# Patient Record
Sex: FEMALE
[De-identification: names, ages, dates, MRNs, and addresses within clinical notes are randomized per-mention and may not be internally consistent; named-entity substitution may affect disease eponyms.]

---

## 2017-07-10 ENCOUNTER — HOSPITAL ENCOUNTER (EMERGENCY)
Dept: HOSPITAL 31 - C.ER | Age: 22
Discharge: HOME | End: 2017-07-10
Payer: MEDICAID

## 2017-07-10 VITALS
RESPIRATION RATE: 18 BRPM | TEMPERATURE: 98 F | DIASTOLIC BLOOD PRESSURE: 72 MMHG | HEART RATE: 92 BPM | SYSTOLIC BLOOD PRESSURE: 112 MMHG

## 2017-07-10 VITALS — OXYGEN SATURATION: 100 %

## 2017-07-10 VITALS — BODY MASS INDEX: 23.8 KG/M2

## 2017-07-10 DIAGNOSIS — Z3A.00: ICD-10-CM

## 2017-07-10 DIAGNOSIS — O23.40: Primary | ICD-10-CM

## 2017-07-10 LAB
BACTERIA #/AREA URNS HPF: (no result) /[HPF]
BILIRUB UR-MCNC: NEGATIVE MG/DL
GLUCOSE UR STRIP-MCNC: NORMAL MG/DL
LEUKOCYTE ESTERASE UR-ACNC: (no result) LEU/UL
PH UR STRIP: 6 [PH] (ref 5–8)
PROT UR STRIP-MCNC: (no result) MG/DL
RBC # UR STRIP: (no result) /UL
SP GR UR STRIP: 1.02 (ref 1–1.03)
SQUAMOUS EPITHIAL: 19 /HPF (ref 0–5)
URINE NITRATE: NEGATIVE
UROBILINOGEN UR-MCNC: NORMAL MG/DL (ref 0.2–1)

## 2017-07-10 NOTE — US
PROCEDURE:  Ultrasound of the Kidneys



HISTORY:

pregnant, dysuria



COMPARISON:

None available.



TECHNIQUE:

Sonogram of the kidneys.



FINDINGS:



RIGHT KIDNEY:

Measures: 10.9 cm. 



Normal in size, contour and echogenicity. 



No stone, solid mass lesion or hydronephrosis visualized. 



LEFT KIDNEY:

Measures: 11.6 cm. 



Normal in size, contour and echogenicity. 



No stone, solid mass lesion or hydronephrosis visualized. 



OTHER FINDINGS:

Urinary bladder nondistended at the time of this examination.



IMPRESSION:

Unremarkable renal sonogram.

## 2017-07-10 NOTE — C.PDOC
History Of Present Illness


21 year old female who presents to the ER with a complaint of breast tenderness

, feeling sick in the mornings, and occasional vomiting. She reports occasional 

epigastric pain after AM vomiting.  Patient also reports intermittent burning 

with urination; LMP was in 4/17. Denies fever, vaginal bleeding, vaginal 

discharge, or abdominal pain.


Time Seen by Provider: 07/10/17 10:09


Chief Complaint (Nursing): Abdominal Pain


History Per: Patient


History/Exam Limitations: no limitations


Onset/Duration Of Symptoms: Days


Current Symptoms Are (Timing): Still Present


Recent travel outside of the [place]: No





Past Medical History


Reviewed: Historical Data, Nursing Documentation, Vital Signs


Vital Signs: 


 Last Vital Signs











Temp  98.0 F   07/10/17 12:12


 


Pulse  92 H  07/10/17 12:12


 


Resp  18   07/10/17 12:12


 


BP  112/72   07/10/17 12:12


 


Pulse Ox  100   07/10/17 13:01














- Medical History


PMH: Migraine


Surgical History: No Surg Hx


Family History: States: Unknown Family Hx





- Social History


Hx Alcohol Use: No


Hx Substance Use: No





- Immunization History


Hx Tetanus Toxoid Vaccination: No


Hx Influenza Vaccination: No


Hx Pneumococcal Vaccination: No





Review Of Systems


Constitutional: Negative for: Fever


ENT: Negative for: Ear Pain, Ear Discharge


Cardiovascular: Negative for: Chest Pain, Palpitations, Orthopnea, Edema, Light 

Headedness


Respiratory: Negative for: Shortness of Breath, SOB with Excertion, Wheezing


Gastrointestinal: Positive for: Nausea (in morning), Vomiting.  Negative for: 

Abdominal Pain, Diarrhea, Constipation


Genitourinary: Positive for: Dysuria.  Negative for: Vaginal Discharge, Vaginal 

Bleeding


Musculoskeletal: Positive for: Other (Breast pain)


Psych: Negative for: Anxiety, Depression





Physical Exam





- Physical Exam


Appears: Well, Non-toxic, No Acute Distress


Skin: Normal Color, Warm, Dry


Head: Atraumatic, Normacephalic


Eye(s): bilateral: Normal Inspection, PERRL, EOMI


Oral Mucosa: Moist


Neck: Normal, Supple


Chest: Symmetrical (No palpable lumps), No Tenderness, Other (No nipple 

discharge (chaperoned by mother during breast exam))


Cardiovascular: Rhythm Regular, No Murmur


Respiratory: Normal Breath Sounds, No Rales, No Rhonchi, No Wheezing


Gastrointestinal/Abdominal: Soft, No Tenderness, No Mass, No Distention


Extremity: Normal ROM


Neurological/Psych: Oriented x3, Normal Speech, Normal Cognition





ED Course And Treatment


O2 Sat by Pulse Oximetry: 100 (Room air)


Pulse Ox Interpretation: Normal





Medical Decision Making


Medical Decision Making: 





POC urine pregnancy was positive.  Spoke to patient and she is aware.  I 

advised patient that breast sensitivity can be normal in pregnancy but that she 

needed to follow-up with ob/gyn for mammography if breast pain continues or 

lump develops.  Patient is currently tolerating po in ED and denies abdominal 

pain.  Symptoms likely secondary to "morning sickness."





UA shows trace ketones, but patient is tolerating po.  Shows blood, leukocytes, 

wbc and bacteria. Will treat with macrobid.  Renal u/s is negative and patient 

has no flank or abdominal pain.  Abdomen is soft NT/ND





Will dc with prenatal vitamin.  Advised to follow up with OB/GYN for further 

monitoring of this pregnancy





Disposition





- Disposition


Referrals: 


Jersey Clemons MD [Staff Provider] - 


Disposition: HOME/ ROUTINE


Disposition Time: 12:56


Condition: GOOD


Additional Instructions: 


Take full course of antibiotics for uti.  Take daily prenatal vitamin.  Follow-

up with OB within 2 days for further monitoring of your pregnancy.  Return to 

ED if condition worsens.


Prescriptions: 


Nitrofurantoin Macrocrystals [Macrobid] 100 mg PO BID #20 cap


Prenatal Vit No.126/Iron/Folic [Classic Prenatal Tablet] 1 each PO DAILY #30 

tablet


Instructions:  Morning Sickness (ED), Pregnancy (ED), Urinary Tract Infection 

in Pregnancy (ED)


Print Language: Wallisian





- Clinical Impression


Clinical Impression: 


 Pregnancy, UTI (urinary tract infection)








- Scribe Statement


The provider has reviewed the documentation as recorded by the Scribglory Yang





All medical record entries made by the Scribe were at my direction and 

personally dictated by me. I have reviewed the chart and agree that the record 

accurately reflects my personal performance of the history, physical exam, 

medical decision making, and the department course for this patient. I have 

also personally directed, reviewed, and agree with the discharge instructions 

and disposition.

## 2018-02-11 ENCOUNTER — HOSPITAL ENCOUNTER (INPATIENT)
Dept: HOSPITAL 14 - H.EROB2 | Age: 23
LOS: 6 days | Discharge: HOME | DRG: 371 | End: 2018-02-17
Payer: COMMERCIAL

## 2018-02-11 VITALS — BODY MASS INDEX: 31.6 KG/M2

## 2018-02-11 DIAGNOSIS — Z3A.41: ICD-10-CM

## 2018-02-11 DIAGNOSIS — O48.0: ICD-10-CM

## 2018-02-11 LAB
BASOPHILS # BLD AUTO: 0 K/UL (ref 0–0.2)
BASOPHILS NFR BLD: 0.5 % (ref 0–2)
EOSINOPHIL # BLD AUTO: 0.1 K/UL (ref 0–0.7)
EOSINOPHIL NFR BLD: 0.8 % (ref 0–4)
ERYTHROCYTE [DISTWIDTH] IN BLOOD BY AUTOMATED COUNT: 14.8 % (ref 11.5–14.5)
HGB BLD-MCNC: 12.2 G/DL (ref 12–16)
LYMPHOCYTES # BLD AUTO: 1.7 K/UL (ref 1–4.3)
LYMPHOCYTES NFR BLD AUTO: 22 % (ref 20–40)
MCH RBC QN AUTO: 30.7 PG (ref 27–31)
MCHC RBC AUTO-ENTMCNC: 33.9 G/DL (ref 33–37)
MCV RBC AUTO: 90.8 FL (ref 81–99)
MONOCYTES # BLD: 0.8 K/UL (ref 0–0.8)
MONOCYTES NFR BLD: 9.7 % (ref 0–10)
NEUTROPHILS # BLD: 5.2 K/UL (ref 1.8–7)
NEUTROPHILS NFR BLD AUTO: 67 % (ref 50–75)
NRBC BLD AUTO-RTO: 0.1 % (ref 0–0)
PLATELET # BLD: 278 K/UL (ref 130–400)
PMV BLD AUTO: 7.8 FL (ref 7.2–11.7)
RBC # BLD AUTO: 3.96 MIL/UL (ref 3.8–5.2)
WBC # BLD AUTO: 7.7 K/UL (ref 4.8–10.8)

## 2018-02-11 PROCEDURE — 4A1HXCZ MONITORING OF PRODUCTS OF CONCEPTION, CARDIAC RATE, EXTERNAL APPROACH: ICD-10-PCS

## 2018-02-12 NOTE — OBHP
===========================

Datetime: 2018 00:11

===========================

   

FHR - Baseline A Provider:  150

Vital Signs Provider:  Reviewed

NICHD Variability Prov Fetus A:  Moderate 6-25bpm

NICHD Accel Fetus A IP Provider:  [id number]

FHR Category Provider Fetus A:  Category I

NICHD Decel Fetus A IP Provider:  None

Dilatation, Provider:  0

Effacement, Provider:  0

Station, Provider:  0

   

===========================

Datetime: 2018 23:15

===========================

   

IP Adm Impression:  Term, intrauterine pregnancy; No Active Labor

IP Adm Impression Other:  Post EDC

IP Admit Plan:  Admit to unit; Initiate labor induction protocol

Admit Comment, IP Provider:  23 yo F,  at 41 weeks gestational age, with RAYMOND 18, presented to
 labor and delivery unit for scheduled induction of labor. 

   Reports good fetal movement, denies vaginal bleeding, denies contractions, denies loss of fluid. H
as no complaints at this time. 

      

   Prenatal care: [facility], Dr. Carr

   GBS neg (18),  HIV neg (17) RPR NR (17), HbsAg neg, Rubella immue, CL/GC neg, ABO
 Opos antibody neg, CXR no active disease 2017. 

   Received flu and tdap prenatally

   U/s  no previa, cephalic presentation.

      

   ObGYN Hx: none; first pregnancy. denies hx abnormal gyn issues.

   PMH: none

   Past Surg hx: none

   Family hx: diabetes mellitus on father's side

   Meds: PNV

   Allergies: NKDA

      

   ROS: denies headache, blurry vision, chest pain, shortness of breath, nausea, vomiting, diarrhea, 
constipation, edema.

      

   Assessment: 23 yo  with single IUP at 41 weeks gestational age, here for scheduled induction o
f labor.

   Plan: Admit to unit, initiate induction of labor. 

   [email or web address]

   Discussed w/ Dr. Hill

      

   Pt seen and discussed with Resident and I agree with the above.

Extremities - PN:  Normal

Abdomen - PN:  Normal

Back - PN:  Normal

Breast - PN:  Not Done

Lungs - PN:  Normal

Heart - PN:  Normal

Thyroid - PN:  Not Done

Neurologic - PN:  Normal

HEENT - PN:  Normal

General - PN:  Normal

Comments, ACOG Physical Exam:  Gen: alert, oriented, no acute distress

   CV: regular rhythm

   Resp: normal effort of respitation

   Abdomen: gravid, nontender

   Extremities: no significant edema

      

   SVE: cervix closed and long. 

IP Prenatal Hx Assessment:  The Prenatal History has been Reviewed and is Current

EGA AdmitDate IP:  41.0

IP Indication for Induction:  Postterm

IP Chief Complaint:  Scheduled induction of labor

Genitourinary Exam:  Normal

DTRs - PN:  Not Done

## 2018-02-12 NOTE — OBADHP
===========================

Datetime: 2018 00:11

===========================

   

FHR - Baseline A Provider:  150

Vital Signs Provider:  Reviewed

NICHD Variability Prov Fetus A:  Moderate 6-25bpm

NICHD Accel Fetus A IP Provider:  [id number]

FHR Category Provider Fetus A:  Category I

NICHD Decel Fetus A IP Provider:  None

Dilatation, Provider:  0

Effacement, Provider:  0

Station, Provider:  0

   

===========================

Datetime: 2018 23:15

===========================

   

IP Adm Impression Other:  Post EDC

Admit Comment, IP Provider:  23 yo F,  at 41 weeks gestational age, with RAYMOND 18, presented to
 labor and delivery unit for scheduled induction of labor. 

   Reports good fetal movement, denies vaginal bleeding, denies contractions, denies loss of fluid. H
as no complaints at this time. 

      

   Prenatal care: [facility], Dr. Carr

   GBS neg (18),  HIV neg (17) RPR NR (17), HbsAg neg, Rubella immue, CL/GC neg, ABO
 Opos antibody neg, CXR no active disease 2017. 

   Received flu and tdap prenatally

   U/s  no previa, cephalic presentation.

      

   ObGYN Hx: none; first pregnancy. denies hx abnormal gyn issues.

   PMH: none

   Past Surg hx: none

   Family hx: diabetes mellitus on father's side

   Meds: PNV

   Allergies: NKDA

      

   ROS: denies headache, blurry vision, chest pain, shortness of breath, nausea, vomiting, diarrhea, 
constipation, edema.

      

   Assessment: 23 yo  with single IUP at 41 weeks gestational age, here for scheduled induction o
f labor.

   Plan: Admit to unit, initiate induction of labor. 

   [email or web address]

   Discussed w/ Dr. Hill

      

   Pt seen and discussed with Resident and I agree with the above.

Extremities - PN:  Normal

Abdomen - PN:  Normal

Back - PN:  Normal

Breast - PN:  Not Done

Lungs - PN:  Normal

Heart - PN:  Normal

Thyroid - PN:  Not Done

Neurologic - PN:  Normal

HEENT - PN:  Normal

General - PN:  Normal

Comments, ACOG Physical Exam:  Gen: alert, oriented, no acute distress

   CV: regular rhythm

   Resp: normal effort of respitation

   Abdomen: gravid, nontender

   Extremities: no significant edema

      

   SVE: cervix closed and long. 

IP Prenatal Hx Assessment:  The Prenatal History has been Reviewed and is Current

IP Chief Complaint:  Scheduled induction of labor

Genitourinary Exam:  Normal

DTRs - PN:  Not Done

EGA AdmitDate IP:  41.0

IP Adm Impression:  Term, intrauterine pregnancy; No Active Labor

IP Admit Plan:  Admit to unit; Initiate labor induction protocol

## 2018-02-12 NOTE — OBPN
===========================

Datetime: 02/12/2018 12:16

===========================

   

IP Progress Impression Other:  Induction  for post-dates

IP Procedures:  Sterile Vag Exam

IP Progress Plan:  Induction

Membranes, Provider:  Intact

Contraction Comments Provider:  Q2-3 

FHR - Baseline A Provider:  130s

IP Progress Note Comment:  23 yo G1 at 41+1 wks for induction of labor for post-dates

   NST reactive 

   Will start miso 50 mcg q 4 to continue IOL 

      

      

NICHD Accel Fetus A IP Provider:  [id number]

FHR Category Provider Fetus A:  Category I

NICHD Variability Prov Fetus A:  Minimal - Undetectable to <5bpm

Dilatation, Provider:  1

Effacement, Provider:  65

Station, Provider:  -3

NICHD Decel Fetus A IP Provider:  None

   

===========================

Datetime: 02/12/2018 07:49

===========================

   

IP Progress Impression:  Normal progression of labor; Reassuring fetal heart rate

Gestation - Est Wks by US:  41.0

Fetal Presentation-Admit:  Vertex

   

===========================

Datetime: 02/12/2018 00:11

===========================

   

Vital Signs Provider:  Reviewed

## 2018-02-13 RX ADMIN — NALBUPHINE HYDROCHLORIDE PRN MG: 20 INJECTION, SOLUTION INTRAMUSCULAR; INTRAVENOUS; SUBCUTANEOUS at 17:54

## 2018-02-13 RX ADMIN — NALBUPHINE HYDROCHLORIDE PRN MG: 20 INJECTION, SOLUTION INTRAMUSCULAR; INTRAVENOUS; SUBCUTANEOUS at 02:44

## 2018-02-13 RX ADMIN — NALBUPHINE HYDROCHLORIDE PRN MG: 20 INJECTION, SOLUTION INTRAMUSCULAR; INTRAVENOUS; SUBCUTANEOUS at 10:30

## 2018-02-13 NOTE — OBPN
===========================

Datetime: 02/13/2018 18:46

===========================

   

IP Progress Impression:  Reassuring fetal heart rate

IP Progress Plan:  Induction

Contraction Comments Provider:  4-9min

FHR - Baseline A Provider:  120

Fetal Presentation-Admit:  Vertex

IP Progress Note Comment:   s: no c/o. 

   i: 41.2wk induction

   s/p cervidel _ cytotec

   p: begin pitoicn

   plan d/w pt.

Vital Signs Provider:  Within Normal Limits

NICHD Accel Fetus A IP Provider:  [id number]

FHR Category Provider Fetus A:  Category I

NICHD Variability Prov Fetus A:  Minimal - Undetectable to <5bpm

NICHD Decel Fetus A IP Provider:  None

## 2018-02-14 RX ADMIN — OXYCODONE HYDROCHLORIDE AND ACETAMINOPHEN PRN TAB: 5; 325 TABLET ORAL at 14:29

## 2018-02-14 RX ADMIN — OXYCODONE HYDROCHLORIDE AND ACETAMINOPHEN PRN TAB: 5; 325 TABLET ORAL at 21:57

## 2018-02-14 NOTE — OP
PROCEDURE DATE:



PREOPERATIVE DIAGNOSES:  Arrest of dilatation, fetal intolerance to labor.



POSTOPERATIVE DIAGNOSES:  Arrest of dilatation, fetal intolerance to labor.



OPERATION PERFORMED:  Primary low flap transverse  section by

Pfannenstiel skin incision.



SURGEON:  Cesar Demarco MD



ASSISTANT:  Dr. Garcia.  She was instrumentally taking care of the

patient.  She helped to create the exposure, she was helpful in obtaining

hemostasis, delivering the infant and closure of the patient.  The

procedure would not have been possible without her assistance.



ESTIMATED BLOOD LOSS:  800 mL.



URINE OUTPUT:  Huertas catheter put out approximately 175 mL of clear urine. 

The patient received approximately 1 liter of D5 LR intraoperatively.



TYPE OF ANESTHESIA:  Epidural.



ANESTHESIA ADMINISTERED BY:  Kera Johnson MD



OPERATIVE FINDINGS:  Baby girl, Apgars 9 and 9, vertex presentation,

weighing 3820 grams.  Normal uterus, tubes and ovaries are identified.



DESCRIPTION OF PROCEDURE:  After informed consent was obtained, the patient

was taken to the operating room where she was post her epidural, she was

then placed in a supine position with the leftward tilt.  The patient was

then prepped and draped in a normal sterile fashion.  A Pfannenstiel skin

incision was then made with a scalpel and carried down to the underlying

layer of fascia.  The fascia was nicked in the midline.  The fascial

incision was then extended laterally with the curved Blount scissors.  The

superior aspect of the fascial incision was then grasped with Kocher

clamps, elevated up and the rectus muscles were dissected off using both

sharp and blunt dissection.  Attention was then turned to the inferior

aspect of the fascial incision, which in similar fashion was grasped with

Kocher clamps, elevated up, and the rectus muscles were dissected off using

both sharp and blunt dissection.  The rectus muscles were then  in

the midline.  The peritoneum was identified and entered sharply with the

Metzenbaum scissors.  The peritoneal incision was then extended superiorly

and inferiorly with good visualization of the bladder.  The bladder blade

was inserted.  The vesicouterine and peritoneum were identified and entered

sharply with the Metzenbaum scissors.  The incision was then extended

laterally and the bladder flap was created digitally.  The bladder blade

was then re-adjusted.  A low transverse incision was then made with a

scalpel.  The incision was then extended laterally with bandage scissors. 

The infant's head was then delivered atraumatically.  The nose and mouth

were suctioned with _____ suction trap.  The cord was clamped and cut and

the infant was handed off to waiting pediatricians.  The placenta was then

removed manually.  The uterus was exteriorized and cleared of all clots and

debris.  The uterine incision was then repaired with 0 Vicryl in a running

lock fashion.  A second layer of the same suture was used to obtain

excellent hemostasis.  The abdomen was then copiously irrigated.  The

irrigant was removed with a suction device.  Hemostasis was noted.  The

uterus was returned to the abdomen.  The gutters were then cleared of all

clots and debris.  The peritoneum was closed with 2-0 Vicryl in a running

fashion.  The muscle was closed with 0 Vicryl in an interrupted fashion. 

The fascia was closed with 0-Vicryl in a running fashion.  The skin was

closed with 4-0 on a Markus needle.  All sponge, lap, needle, and instrument

counts were correct x2, and the patient was taken to the recovery room in

awake and stable condition.





__________________________________________

Cesar Demarco MD





DD:  2018 11:41:18

DT:  2018 13:14:16

Job # [id number]

## 2018-02-14 NOTE — OBPN
===========================

Datetime: 02/14/2018 07:15

===========================

   

IP Progress Note Comment:  s: no c/o

   o: 4/100/-3

   arom of forebag-clear

   pit @ 8mU

   i: 41.33wk

   induction

   p: continue pit

## 2018-02-15 LAB
ERYTHROCYTE [DISTWIDTH] IN BLOOD BY AUTOMATED COUNT: 14.9 % (ref 11.5–14.5)
HGB BLD-MCNC: 9.9 G/DL (ref 12–16)
MCH RBC QN AUTO: 31.6 PG (ref 27–31)
MCHC RBC AUTO-ENTMCNC: 34.9 G/DL (ref 33–37)
MCV RBC AUTO: 90.6 FL (ref 81–99)
PLATELET # BLD: 225 K/UL (ref 130–400)
RBC # BLD AUTO: 3.13 MIL/UL (ref 3.8–5.2)
WBC # BLD AUTO: 14.5 K/UL (ref 4.8–10.8)

## 2018-02-15 RX ADMIN — OXYCODONE HYDROCHLORIDE AND ACETAMINOPHEN PRN TAB: 5; 325 TABLET ORAL at 10:29

## 2018-02-15 RX ADMIN — OXYCODONE HYDROCHLORIDE AND ACETAMINOPHEN PRN TAB: 5; 325 TABLET ORAL at 22:22

## 2018-02-15 RX ADMIN — OXYCODONE HYDROCHLORIDE AND ACETAMINOPHEN PRN TAB: 5; 325 TABLET ORAL at 01:51

## 2018-02-15 RX ADMIN — OXYCODONE HYDROCHLORIDE AND ACETAMINOPHEN PRN TAB: 5; 325 TABLET ORAL at 18:37

## 2018-02-15 NOTE — OBPPN
===========================

Datetime: 02/15/2018 07:44

===========================

   

PP Pain Prov:  Within normal limits

PP Nausea Prov:  Denies

PP Flatus Prov:  No

PP BM Prov:  No

PP Breasts Prov:  Not Done

PP Heart Prov:  Normal

PP Lungs Prov:  Normal

PP Abdomen/Uterus Prov:  Normal

PP Lochia Prov:  Not Done

PP Vulva/Perineum Prov:  Not Done

PP CVA Tenderness Prov:  Not Done

PP Extremities Prov:  Normal

PP C/S Incision Prov:  Normal

PP Breastfeeding Progress Prov:  Normal

PP Impression Prov:  Normal postpartum progression

PP Plan Prov:  Continue present management

PP Progress Note Prov:  23 yo  s/p primary , POD 1. Pt reports abdominal pain overnig
ht that is alleviated with medication. Has not ambulated out of bed yet. Has not voided- higgins remove
d this am- and has not passed gas yet. Breast and bottle feeding infant. Tolerating liquid diet witho
ut nausea/vomitting. Denies dizziness, chest pain, shortness of breath, nausea, vomiting, pain in rebeca
ves.

      

   PE:

   Gen: alert, oriented

   CV: S1S2,RRR

   Resp: clear to auscultation bilaterally

   Abd: Dressing in place, dressing is dry and clean. Tender to palpation, uterus firm. 

   Ext: no edema, no tenderness

      

   A: 23 yo , POD 1 after primary .

   P: Encourage breastfeeding and ambulation. Pain control. Encourage incentive spirometer use. Pendi
mahesh postpartum CBC. 

   [email or web address] 

      

   Addendum:

   Called to see patient due to c/o chest pain.  VSS at this time.  Plan to check Spiral CT to R/O PE
.  Discussed plan with patient and all patient questions answered

Vital Signs Provider PP:  Reviewed

## 2018-02-16 RX ADMIN — OXYCODONE HYDROCHLORIDE AND ACETAMINOPHEN PRN TAB: 5; 325 TABLET ORAL at 17:56

## 2018-02-16 RX ADMIN — OXYCODONE HYDROCHLORIDE AND ACETAMINOPHEN PRN TAB: 5; 325 TABLET ORAL at 09:29

## 2018-02-16 RX ADMIN — OXYCODONE HYDROCHLORIDE AND ACETAMINOPHEN PRN TAB: 5; 325 TABLET ORAL at 22:38

## 2018-02-16 NOTE — OBPPN
===========================

Datetime: 2018 07:03

===========================

   

PP Pain Prov:  Within normal limits

PP Nausea Prov:  Denies

PP Flatus Prov:  Yes

PP BM Prov:  No

PP Breasts Prov:  Not Done

PP Heart Prov:  Normal

PP Lungs Prov:  Normal

PP Abdomen/Uterus Prov:  Normal

PP Lochia Prov:  Normal

PP Vulva/Perineum Prov:  Not Done

PP CVA Tenderness Prov:  Not Done

PP Extremities Prov:  Normal

PP C/S Incision Prov:  Normal

PP Breastfeeding Progress Prov:  Normal

PP Impression Prov:  Normal postpartum progression

PP Progress Note Prov:  23 yo  s/p , POD 2. Reports pain that is controlled with medi
cation. Ambulated around unit yesterday without dizziness/difficulty. Voiding freely on her own, pass
ing gas, has not had BM. Lochia like menses. Tolerating regular diet. Breast and bottle feeding infan
t. 

      

   Yesterday she complained of chest pain and CT angio was ordered, but pt declined to undergo exam; 
diagnostic value of exam was explained to pt. Currently denies dizziness, shortness of breath, nausea
, vomiting,  leg/calf pain.

      

   PE:

   Gen: alert, NAD

   CV: S1S2

   Resp: clear breath sounds

   Abd: wearing abdominal binder at start of exam. Dressing has been removed; incision is clean and d
ry, steri strips in place. No erythema, blood or other exudates. 

   Ext: no pain/tenderness

      

   A: 23 yo  s/p  on day 2. Experienced chest pain yesterday that has since resolved;
 pt declined CT angio.

   P: Continue current management; encourage breastfeeding and ambulation, pain control. Encourage in
centive spirometry. Encourage pt to alert if she feels recurrent chest pain.

      

   The patient was seen with the resident and I agree with the notes

Vital Signs Provider PP:  Reviewed

## 2018-02-17 VITALS
HEART RATE: 109 BPM | SYSTOLIC BLOOD PRESSURE: 96 MMHG | OXYGEN SATURATION: 98 % | RESPIRATION RATE: 20 BRPM | TEMPERATURE: 98.2 F | DIASTOLIC BLOOD PRESSURE: 61 MMHG

## 2018-02-17 RX ADMIN — OXYCODONE HYDROCHLORIDE AND ACETAMINOPHEN PRN TAB: 5; 325 TABLET ORAL at 03:05

## 2018-02-25 ENCOUNTER — HOSPITAL ENCOUNTER (EMERGENCY)
Dept: HOSPITAL 14 - H.ER | Age: 23
Discharge: HOME | End: 2018-02-25
Payer: COMMERCIAL

## 2018-02-25 VITALS — RESPIRATION RATE: 18 BRPM

## 2018-02-25 VITALS
DIASTOLIC BLOOD PRESSURE: 73 MMHG | OXYGEN SATURATION: 99 % | TEMPERATURE: 98.8 F | HEART RATE: 72 BPM | SYSTOLIC BLOOD PRESSURE: 122 MMHG

## 2018-02-25 VITALS — BODY MASS INDEX: 31.6 KG/M2

## 2018-02-25 DIAGNOSIS — N39.0: ICD-10-CM

## 2018-02-25 DIAGNOSIS — L76.22: Primary | ICD-10-CM

## 2018-02-25 DIAGNOSIS — Z98.890: ICD-10-CM

## 2018-02-25 LAB
BACTERIA #/AREA URNS HPF: (no result) /[HPF]
BACTERIA #/AREA URNS HPF: (no result) /[HPF]
BILIRUB UR-MCNC: NEGATIVE MG/DL
BILIRUB UR-MCNC: NEGATIVE MG/DL
COLOR UR: YELLOW
COLOR UR: YELLOW
GLUCOSE UR STRIP-MCNC: (no result) MG/DL
GLUCOSE UR STRIP-MCNC: (no result) MG/DL
LEUKOCYTE ESTERASE UR-ACNC: (no result) LEU/UL
LEUKOCYTE ESTERASE UR-ACNC: (no result) LEU/UL
PH UR STRIP: 6 [PH] (ref 5–8)
PH UR STRIP: 6 [PH] (ref 5–8)
PROT UR STRIP-MCNC: NEGATIVE MG/DL
PROT UR STRIP-MCNC: NEGATIVE MG/DL
RBC # UR STRIP: (no result) /UL
RBC # UR STRIP: (no result) /UL
RENAL EPI CELLS #/AREA URNS HPF: 1 /HPF (ref 0–3)
SP GR UR STRIP: 1 (ref 1–1.03)
SP GR UR STRIP: 1 (ref 1–1.03)
SQUAMOUS EPITHIAL: 1 /HPF (ref 0–5)
SQUAMOUS EPITHIAL: 1 /HPF (ref 0–5)
URINE CLARITY: (no result)
URINE CLARITY: (no result)
URINE NITRATE: NEGATIVE
URINE NITRATE: NEGATIVE
UROBILINOGEN UR-MCNC: (no result) MG/DL (ref 0.2–1)
UROBILINOGEN UR-MCNC: (no result) MG/DL (ref 0.2–1)

## 2018-02-25 NOTE — ED PDOC
HPI: Female  Pain


Time Seen by Provider: 18 04:31


Chief Complaint (Nursing): Abnormal Skin Integrity


Chief Complaint (Provider): Wound bleeding


History Per: Patient


Additional Complaint(s): 





Isaac is 23 yo F patient that presents to ED c/o bleeding from C/Section 

wound since today, she noticed the bleeding 1-2 hours ago when she went to the 

bathroom, patient reports she got the  on 18(10 days ago). Lochia 

like menses, no vaginal discharge. Patient states mild burning on urination. 

Otherwise she denies abdominal pain, wound pain, fever, nausea, vomiting, no 

chest pain, palpitations, sob. 





Past Medical History





- Medical History


PMH: Migraine


   Denies: Depression, Diabetes, HTN





- Family History


Family History: States: Unknown Family Hx





- Immunization History


Hx Tetanus Toxoid Vaccination: No


Hx Influenza Vaccination: No


Hx Pneumococcal Vaccination: No





- Home Medications


Home Medications: 


 Ambulatory Orders











 Medication  Instructions  Recorded


 


Prenatal Vit No.126/Iron/Folic 1 each PO DAILY #30 tablet 07/10/17





[Classic Prenatal Tablet]  


 


Acetaminophen/Oxycodone Hydr 1 tab PO Q6 PRN #20 tab 18





[Oxycodone and Acetaminophen 325  





mg-2.5 mg]  


 


Ferrous Sulfate 325 mg PO DAILY #30 tablet 18


 


Ibuprofen [Motrin Tab] 600 mg PO Q6 PRN #20 tab 18


 


Sennosides A and B [Senokot Tab] 17.2 mg PO HS PRN #14 tab 18


 


Nitrofurantoin Macrocrystals 100 mg PO BID #14 cap 18





[Macrobid]  














- Allergies


Allergies/Adverse Reactions: 


 Allergies











Allergy/AdvReac Type Severity Reaction Status Date / Time


 


No Known Allergies Allergy   Verified 07/10/17 09:38














Review of Systems


ROS Statement: Except As Marked, All Systems Reviewed And Found Negative





Physical Exam





- Reviewed


Nursing Documentation Reviewed: Yes


Vital Signs Reviewed: Yes





- Physical Exam


Appears: Positive for: Well


Head Exam: Positive for: NORMAL INSPECTION


Eye Exam: Positive for: EOMI, PERRL.  Negative for: Nystagmus


Cardiovascular/Chest: Positive for: Regular Rate, Rhythm.  Negative for: Murmur


Respiratory: Positive for: Normal Breath Sounds


Gastrointestinal/Abdominal: Positive for: Bowel Sounds, Soft, Tenderness (Low 

transverse incision noted, middle dot oozing red blood from wound, mild 

induration noted under oozing zone. Mild tenderness on palpation. No erythema 

or deshicense noted.).  Negative for: Distended


Extremity: Positive for: Swelling.  Negative for: Calf Tenderness


Neurologic/Psych: Positive for: Alert, CNs II-XII, Oriented





- Progress


ED Course And Treament: 





Urinalysis


Gelfoam TP on wound site


Compression.


Reeval.





UA positive for UTI


Macrobid 100mg PO once.


OB/GYN consult.





Disposition





- Clinical Impression


Clinical Impression: 


 Post-op bleeding, UTI (urinary tract infection)








- Patient ED Disposition


Is Patient to be Admitted: Transfer of Care


Discussed With : Candelario Parsons


Doctor Will See Patient In The: ED





- Disposition


Disposition: Transfer of Care


Disposition Time: 06:57


Condition: STABLE


Prescriptions: 


Nitrofurantoin Macrocrystals [Macrobid] 100 mg PO BID #14 cap


Instructions:  Urinary Tract Infections in Adults, Bleeding After Surgery


Forms:  [employer] (English)

## 2018-02-25 NOTE — ED PDOC
- ECG


O2 Sat by Pulse Oximetry: 97





- Progress


ED Course And Treament: 


0700 Assumed care from Dr Escalera. Patient awaiting consult and to be seen by Ob 

on call. 





0720 Patient is seen by OB on call. See consult note. Per OB patient can be 

discharged. 





Disposition


Doctor Will See Patient In The: Office


Counseled Patient/Family Regarding: Studies Performed, Diagnosis, Need For 

Followup





- Clinical Impression


Clinical Impression: 


 Post-op bleeding, UTI (urinary tract infection)








- POA


Present On Arrival: None





- Disposition


Referrals: 


[facility] [Outside]


Disposition: Routine/Home


Disposition Time: 07:25


Condition: GOOD


Additional Instructions: 


Follow up with your PCP within 1 week. 


Prescriptions: 


Nitrofurantoin Macrocrystals [Macrobid] 100 mg PO BID #14 cap


Instructions:  Urinary Tract Infections in Adults, Bleeding After Surgery

## 2018-02-25 NOTE — CP.PCM.CON
<Sultan Oscar - Last Filed: 18 08:13>





History of Present Illness





- History of Present Illness


History of Present Illness: 





23 yo  s/p  on 18 presents to ED this AM w/ c/o oozing from 

 wound site this AM. Pt reports she noticed the bleeding 1-2 hours ago 

prior to coming to ED when she went to the bathroom. Denies any injury to the 

site. Pt reports lochia like menses, no vaginal discharge. Patient reports mild 

dysuria. Denies abdominal pain, nausea, vomiting, chest pain, palpitations, sob

, fever or chills. Pt was seen on 18 at [facility] for wound 

check.











Review of Systems





- Constitutional


Constitutional: absent: Chills, Fever





- Cardiovascular


Cardiovascular: absent: Chest Pain, Dyspnea on Exertion





- Respiratory


Respiratory: absent: Cough, Dyspnea





- Gastrointestinal


Gastrointestinal: absent: Abdominal Pain, Nausea, Vomiting





- Genitourinary


Genitourinary: Dysuria





- Integumentary


Integumentary: absent: Rash





- Neurological


Neurological: absent: Headaches





Past Patient History





- Infectious Disease


Hx of Infectious Diseases: None





- Past Social History


Smoking Status: Never Smoked





- CARDIAC


Hx Hypertension: No





- NEUROLOGICAL


Hx Migraine: Yes





- PSYCHIATRIC


Hx Depression: No





- SURGICAL HISTORY


Hx Surgeries: No





Meds


Home Medications: 


 Home Medication List











 Medication  Instructions  Recorded  Confirmed  Type


 


Nitrofurantoin Macrocrystals 100 mg PO BID #14 cap 18  Rx





[Macrobid]    











Allergies/Adverse Reactions: 


 Allergies











Allergy/AdvReac Type Severity Reaction Status Date / Time


 


No Known Allergies Allergy   Verified 18 03:32














Physical Exam





- Constitutional


Appears: Non-toxic, No Acute Distress





- Head Exam


Head Exam: ATRAUMATIC, NORMOCEPHALIC





- Respiratory Exam


Respiratory Exam: Clear to Auscultation Bilateral.  absent: Rhonchi, Wheezes





- Cardiovascular Exam


Cardiovascular Exam: REGULAR RHYTHM, +S1, +S2





- GI/Abdominal Exam


GI & Abdominal Exam: Normal Bowel Sounds, Soft.  absent: Distended, Rebound


Additional comments: 





There is a bleeding point at the mid-section of the  wound site. No 

surrounding erythema, swelling or wound dehiscence seen. No tenderness noted.





 Silver nitrate was applied to oozing site. Hemostasis noted after silver 

nitrate application. Wound was covered with gauze and tape. Pt tolerated the 

procedure well. 





- Neurological Exam


Neurological exam: Alert, Oriented x3





Results





- Vital Signs


Recent Vital Signs: 


 Last Vital Signs











Temp  98.8 F   18 07:35


 


Pulse  72   18 07:35


 


Resp  18   18 07:35


 


BP  122/73   18 07:35


 


Pulse Ox  99   18 07:35














- Labs


Labs: 


 Laboratory Results - last 24 hr











  18





  05:00


 


Urine Color  Yellow


 


Urine Clarity  Cloudy


 


Urine pH  6.0


 


Ur Specific Gravity  1.005


 


Urine Protein  Negative


 


Urine Glucose (UA)  Neg


 


Urine Ketones  Negative


 


Urine Blood  Large


 


Urine Nitrate  Negative


 


Urine Bilirubin  Negative


 


Urine Urobilinogen  0.2-1.0


 


Ur Leukocyte Esterase  Large


 


Urine RBC (Auto)  8 H


 


Urine Microscopic WBC  109 H


 


Ur Squamous Epith Cells  1


 


Ur Transition Epith Cell  2


 


Urine Bacteria  Many H














Assessment & Plan





- Assessment and Plan (Free Text)


Assessment: 





Assessment: 23 yo  s/p  on 18 has oozing from wound site.





Plan: 


-Silver nitrate was applied to oozing site. Hemostasis noted after silver 

nitrate application. Wound was covered with gauze and tape. Pt tolerated the 

procedure well. 


-Pt is afebrile and hemodynamically stable.


-Advised to f/u at [facility] in 1 week for wound check.


-ED Precaution given





Pt was seen and examined with OB hospitalist Dr. Hill.











<Giovani Hill O - Last Filed: 18 10:17>





Results





- Vital Signs


Recent Vital Signs: 


 Last Vital Signs











Temp  98.8 F   18 07:35


 


Pulse  72   18 07:35


 


Resp  18   18 07:35


 


BP  122/73   18 07:35


 


Pulse Ox  99   18 07:35














Attending/Attestation





- Attestation


I have personally seen and examined this patient.: Yes


I have fully participated in the care of the patient.: Yes


I have reviewed all pertinent clinical information: Yes


Notes (Text): 





18 10:17


Pt was seen and examined with Resident and agree with the above.

## 2018-02-26 ENCOUNTER — HOSPITAL ENCOUNTER (EMERGENCY)
Dept: HOSPITAL 14 - H.ER | Age: 23
Discharge: HOME | End: 2018-02-26
Payer: COMMERCIAL

## 2018-02-26 VITALS
SYSTOLIC BLOOD PRESSURE: 107 MMHG | HEART RATE: 81 BPM | TEMPERATURE: 98.3 F | OXYGEN SATURATION: 99 % | DIASTOLIC BLOOD PRESSURE: 69 MMHG | RESPIRATION RATE: 16 BRPM

## 2018-02-26 VITALS — BODY MASS INDEX: 31.6 KG/M2

## 2018-02-26 DIAGNOSIS — T81.4XXA: Primary | ICD-10-CM

## 2018-02-26 DIAGNOSIS — Z16.29: ICD-10-CM

## 2018-02-26 NOTE — ED PDOC
HPI: General Adult


Time Seen by Provider: 18 03:43


Chief Complaint (Nursing): Wound Check


History Per: Patient, Family ()


Additional Complaint(s): 





Pt. states today she noticed "bleeding" from her  site. She had a C-

section done by Dr. Demarco in [facility] on 2018 without complications. State 

she was in ED yesterday for same complaint. Had silver nitrate placed over 

wound. Today same discharge came out of the wound. As per  and patient 

wound has not worsened and looks the same from last visit. Denies fever. 





Past Medical History


Reviewed: Historical Data, Nursing Documentation, Vital Signs


Vital Signs: 





 Last Vital Signs











Temp  98.3 F   18 03:32


 


Pulse  81   18 03:32


 


Resp  16   18 03:32


 


BP  107/69   18 03:32


 


Pulse Ox  99   18 03:32














- Medical History


PMH: Migraine


   Denies: Depression, Diabetes, HTN





- Family History


Family History: States: No Known Family Hx





- Immunization History


Hx Tetanus Toxoid Vaccination: No


Hx Influenza Vaccination: No


Hx Pneumococcal Vaccination: No





- Home Medications


Home Medications: 


 Ambulatory Orders











 Medication  Instructions  Recorded


 


Prenatal Vit No.126/Iron/Folic 1 each PO DAILY #30 tablet 07/10/17





[Classic Prenatal Tablet]  


 


Acetaminophen/Oxycodone Hydr 1 tab PO Q6 PRN #20 tab 18





[Oxycodone and Acetaminophen 325  





mg-2.5 mg]  


 


Ferrous Sulfate 325 mg PO DAILY #30 tablet 18


 


Ibuprofen [Motrin Tab] 600 mg PO Q6 PRN #20 tab 18


 


Sennosides A and B [Senokot Tab] 17.2 mg PO HS PRN #14 tab 18


 


Nitrofurantoin Macrocrystals 100 mg PO BID #14 cap 18





[Macrobid]  


 


Cephalexin [cephalexin] 500 mg PO Q6 #28 cap 18














- Allergies


Allergies/Adverse Reactions: 


 Allergies











Allergy/AdvReac Type Severity Reaction Status Date / Time


 


No Known Allergies Allergy   Verified 18 03:32














Review of Systems


ROS Statement: Except As Marked, All Systems Reviewed And Found Negative





Physical Exam





- Physical Exam


Appears: Positive for: Well, Non-toxic, No Acute Distress


Gastrointestinal/Abdominal: Positive for: Soft, Other (transverse incision site 

noted on pelvic area with faint erythema and small amount of yellow discharge 

noted without swelling, induration, or fluctuance; no bleeding)





- ECG


O2 Sat by Pulse Oximetry: 99





- Progress


ED Course And Treament: 





0400


Case d/w Dr. Hill, OBGYN, who knows pt. and states pt. can be treated 

outpatient with Keflex Rx and does not require blood work or face to face 

encounter with him.


Case d/w Dr. Cerda who agrees with care. 


Wound culture obtained. DSD applied. 


Plan d/w patient and  and given strict instructions to return to ED if 

redness or discharge worsens or if fever develops. They both verbalized 

understanding of instructions and necessary f/u.  





Disposition





- Clinical Impression


Clinical Impression: 


 Visit for wound check, Post op infection








- Patient ED Disposition


Is Patient to be Admitted: No





- Disposition


Referrals: 


[facility] [Outside]


[facility] [Outside]


Disposition: Routine/Home


Disposition Time: 04:00


Condition: STABLE


Additional Instructions: 


Follow up with [facility] or [facility] in 2 days for further evaluation. 


Return to ED immediately if symptoms worsen or if temperature of 100.4 or above 

develops.





Prescriptions: 


Cephalexin [cephalexin] 500 mg PO Q6 #28 cap


Instructions:  Surgical Wound (DC), Cellulitis (Skin Infection), Adult (DC)


Print Language: ENGLISH

## 2018-03-03 ENCOUNTER — HOSPITAL ENCOUNTER (EMERGENCY)
Dept: HOSPITAL 14 - H.ER | Age: 23
Discharge: HOME | End: 2018-03-03
Payer: COMMERCIAL

## 2018-03-03 VITALS
HEART RATE: 75 BPM | SYSTOLIC BLOOD PRESSURE: 108 MMHG | DIASTOLIC BLOOD PRESSURE: 72 MMHG | TEMPERATURE: 98 F | RESPIRATION RATE: 16 BRPM | OXYGEN SATURATION: 98 %

## 2018-03-03 VITALS — BODY MASS INDEX: 31.6 KG/M2

## 2018-03-03 NOTE — CP.PCM.CON
History of Present Illness





- History of Present Illness


History of Present Illness: 





Patient is s/p  2 wks ago with wound separation, was packed and 

drained in clinic . Patient presents because there is bleeding and oozing 

from her incision site where the packing is. Patient has appropriate tenderness 

at wound incision, no errythema around incision, no CP, no SOB, no N/V





Past Patient History





- Infectious Disease


Hx of Infectious Diseases: None





- Past Social History


Alcohol: None


Drugs: Denies





- CARDIAC


Hx Hypertension: No





- NEUROLOGICAL


Hx Migraine: Yes





- PSYCHIATRIC


Hx Depression: No





- SURGICAL HISTORY


Hx Surgeries: Yes


Hx  Section: Yes (18)





- ANESTHESIA


Hx Anesthesia: Yes





Meds


Home Medications: 


 Home Medication List











 Medication  Instructions  Recorded  Confirmed  Type


 


Acetaminophen/Oxycodone Hydr 1 tab PO Q6H PRN #10 tab 18  Rx





[Percocet 10/325 mg Tab]    


 


Ciprofloxacin [Cipro] 500 mg PO BID #13 tab 18  Rx


 


Ibuprofen [Motrin] 600 mg PO Q6 PRN #30 tab 18  Rx











Allergies/Adverse Reactions: 


 Allergies











Allergy/AdvReac Type Severity Reaction Status Date / Time


 


No Known Allergies Allergy   Verified 18 01:44














- Medications


Medications: 


 Current Medications





Ibuprofen (Motrin Tab)  600 mg PO STAT STA


   Stop: 18 02:23


Oxycodone/Acetaminophen (Percocet 5/325 Mg Tab)  1 tab PO ONCE STA


   Stop: 18 02:23











Physical Exam





- Constitutional


Appears: Well, Non-toxic





- Respiratory Exam


Respiratory Exam: NORMAL BREATHING PATTERN





- Cardiovascular Exam


Cardiovascular Exam: REGULAR RHYTHM





- GI/Abdominal Exam


Additional comments: 





soft, non-tender, no rebound, no gaurding


1cm of incision open with gauze, serosanginous fluid, q-tip used to integrity 

of fascia


no errythema around incision





- Extremities Exam


Extremities exam: Positive for: normal inspection





Results





- Vital Signs


Recent Vital Signs: 


 Last Vital Signs











Temp  98 F   18 01:44


 


Pulse  75   18 01:44


 


Resp  16   18 01:44


 


BP  108/72   18 01:44


 


Pulse Ox  98   18 02:26














Assessment & Plan





- Assessment and Plan (Free Text)


Plan: 





A/P


1. Patient presents for wound care. Old gauze removed,  wound cleaned 

with saline and betadine and fascia found to still be intact. Iodiform gauze 

used to repack wound and covered with telfa


2. Wound culture found to be sensitive to Cipro, patient's antibiotics changed. 

patient currently afebrile


3. Recommended to give patient Motrin and Percocet script for pain


4. Recommend to patient to follow up in clinic 3/5 for further wound care 





- Date & Time


Date: 18


Time: 02:36

## 2018-03-03 NOTE — ED PDOC
HPI: General Adult


Time Seen by Provider: 18 01:49


Chief Complaint (Nursing): Wound Check


Chief Complaint (Provider): bleeding from surgical incision


History Per: Patient


Additional Complaint(s): 


22-year-old female presents to emergency Department for wound check of C-

section incision. Patient had  on . This past Tuesday 

patient was seen at clinic and had the wound packed secondary to infection. She 

was started on Keflex and was supposed to have follow-up today at the clinic 

but she received call from the clinic that they were closed and her appt was 

canceled.  Patient denies any fever or chills.  Patient is currently not breast 

feeding. She presents this evening due to increasing bleeding and pain to 

surgical site.  





Past Medical History


Reviewed: Historical Data, Nursing Documentation, Vital Signs


Vital Signs: 


 Last Vital Signs











Temp  98 F   18 01:44


 


Pulse  75   18 01:44


 


Resp  16   18 01:44


 


BP  108/72   18 01:44


 


Pulse Ox  98   18 01:52














- Medical History


PMH: Migraine





- Surgical History


Surgical History:  (x 1)





- Family History


Family History: States: No Known Family Hx





- Living Arrangements


Living Arrangements: With Family





- Social History


Current smoker - smoking cessation education provided: No


Alcohol: None


Drugs: Denies





- Immunization History


Hx Tetanus Toxoid Vaccination: No


Hx Influenza Vaccination: No


Hx Pneumococcal Vaccination: No





- Home Medications


Home Medications: 


 Ambulatory Orders











 Medication  Instructions  Recorded


 


Prenatal Vit No.126/Iron/Folic 1 each PO DAILY #30 tablet 07/10/17





[Classic Prenatal Tablet]  


 


Acetaminophen/Oxycodone Hydr 1 tab PO Q6 PRN #20 tab 18





[Oxycodone and Acetaminophen 325  





mg-2.5 mg]  


 


Ferrous Sulfate 325 mg PO DAILY #30 tablet 18


 


Ibuprofen [Motrin Tab] 600 mg PO Q6 PRN #20 tab 18


 


Sennosides A and B [Senokot Tab] 17.2 mg PO HS PRN #14 tab 18


 


Nitrofurantoin Macrocrystals 100 mg PO BID #14 cap 18





[Macrobid]  


 


Cephalexin [cephalexin] 500 mg PO Q6 #28 cap 18


 


Acetaminophen/Oxycodone Hydr 1 tab PO Q6H PRN #10 tab 18





[Percocet 10/325 mg Tab]  


 


Ciprofloxacin [Cipro] 500 mg PO BID #13 tab 18


 


Ibuprofen [Motrin] 600 mg PO Q6 PRN #30 tab 18














- Allergies


Allergies/Adverse Reactions: 


 Allergies











Allergy/AdvReac Type Severity Reaction Status Date / Time


 


No Known Allergies Allergy   Verified 18 01:44














Review of Systems


ROS Statement: Except As Marked, All Systems Reviewed And Found Negative


Constitutional: Negative for: Fever, Chills


Skin: Positive for: Other (wound check of  incision)





Physical Exam





- Reviewed


Nursing Documentation Reviewed: Yes


Vital Signs Reviewed: Yes





- Physical Exam


Appears: Positive for: Well, Non-toxic, No Acute Distress


Skin: Negative for: Rash


Eye Exam: Positive for: Normal appearance


Cardiovascular/Chest: Positive for: Regular Rate, Rhythm


Respiratory: Positive for: Normal Breath Sounds


Gastrointestinal/Abdominal: Positive for: Other (Packed wound noted to c-

section incision site with active purulent drainage and mild surrounding 

tenderness with no erythema or warmth.  )


Extremity: Positive for: Normal ROM


Neurologic/Psych: Positive for: Alert, Oriented





- ECG


O2 Sat by Pulse Oximetry: 98


Pulse Ox Interpretation: Normal





Medical Decision Making


Medical Decision Makin year old here for wound check of  incision





Wound culture shows that cipro is susceptible as per C&S.  Patient was started 

on cipro in ED and advised to d/c keflex.  





Dr. Alfred, OB on call came to bedside and re-packed wound.  Patient given 

Motrin and Percocet in the ED along with prescriptions for same as per Dr. Alfred. Patient was advised to follow up on Monday with clinic for wound recheck.





Disposition





- Clinical Impression


Clinical Impression: 


 Wound dehiscence, 








- Patient ED Disposition


Is Patient to be Admitted: No


Counseled Patient/Family Regarding: Diagnosis, Need For Followup, Rx Given





- Disposition


[facility]errals[facility] 


[facility] [[facility]


Disposition: Routine/Home


Disposition Time: 02:24


Condition: STABLE


Additional Instructions: 


Tick prescription meds as directed. Keep area clean and dry. Follow-up Monday 

with clinic for wound repacking.


Prescriptions: 


Acetaminophen/Oxycodone Hydr [Percocet 10/325 mg Tab] 1 tab PO Q6H PRN #10 tab


 PRN Reason: Pain, Severe (8-10)


Ciprofloxacin [Cipro] 500 mg PO BID #13 tab


Ibuprofen [Motrin] 600 mg PO Q6 PRN #30 tab


 PRN Reason: Pain, Moderate (4-7)


Instructions:  Wound Dehiscence (DC)


Forms:  [employer] (English)